# Patient Record
Sex: MALE | URBAN - METROPOLITAN AREA
[De-identification: names, ages, dates, MRNs, and addresses within clinical notes are randomized per-mention and may not be internally consistent; named-entity substitution may affect disease eponyms.]

---

## 2021-08-02 ENCOUNTER — OFFICE VISIT (OUTPATIENT)
Dept: FAMILY MEDICINE CLINIC | Facility: CLINIC | Age: 41
End: 2021-08-02
Payer: COMMERCIAL

## 2021-08-02 VITALS
BODY MASS INDEX: 32.83 KG/M2 | HEART RATE: 102 BPM | SYSTOLIC BLOOD PRESSURE: 150 MMHG | WEIGHT: 264 LBS | RESPIRATION RATE: 18 BRPM | HEIGHT: 75 IN | TEMPERATURE: 98 F | DIASTOLIC BLOOD PRESSURE: 100 MMHG

## 2021-08-02 DIAGNOSIS — Z00.00 ANNUAL PHYSICAL EXAM: Primary | ICD-10-CM

## 2021-08-02 DIAGNOSIS — I10 ESSENTIAL HYPERTENSION: ICD-10-CM

## 2021-08-02 DIAGNOSIS — E11.9 TYPE 2 DIABETES MELLITUS WITHOUT COMPLICATION, WITHOUT LONG-TERM CURRENT USE OF INSULIN (HCC): ICD-10-CM

## 2021-08-02 DIAGNOSIS — R79.89 LOW TESTOSTERONE: ICD-10-CM

## 2021-08-02 LAB
CREAT UR-SCNC: 82.1 MG/DL
MICROALBUMIN UR-MCNC: 31.2 MG/DL
MICROALBUMIN/CREAT 24H UR-RTO: 380 UG/MG (ref ?–30)

## 2021-08-02 PROCEDURE — 99386 PREV VISIT NEW AGE 40-64: CPT | Performed by: FAMILY MEDICINE

## 2021-08-02 PROCEDURE — 3077F SYST BP >= 140 MM HG: CPT | Performed by: FAMILY MEDICINE

## 2021-08-02 PROCEDURE — 3008F BODY MASS INDEX DOCD: CPT | Performed by: FAMILY MEDICINE

## 2021-08-02 PROCEDURE — 3060F POS MICROALBUMINURIA REV: CPT | Performed by: FAMILY MEDICINE

## 2021-08-02 PROCEDURE — 82043 UR ALBUMIN QUANTITATIVE: CPT | Performed by: FAMILY MEDICINE

## 2021-08-02 PROCEDURE — 99214 OFFICE O/P EST MOD 30 MIN: CPT | Performed by: FAMILY MEDICINE

## 2021-08-02 PROCEDURE — 82570 ASSAY OF URINE CREATININE: CPT | Performed by: FAMILY MEDICINE

## 2021-08-02 PROCEDURE — 3080F DIAST BP >= 90 MM HG: CPT | Performed by: FAMILY MEDICINE

## 2021-08-02 RX ORDER — LISINOPRIL 20 MG/1
20 TABLET ORAL DAILY
Qty: 30 TABLET | Refills: 1 | Status: SHIPPED | OUTPATIENT
Start: 2021-08-02 | End: 2021-10-14

## 2021-08-02 RX ORDER — ORAL SEMAGLUTIDE 7 MG/1
TABLET ORAL
COMMUNITY
End: 2021-08-02

## 2021-08-02 RX ORDER — ORAL SEMAGLUTIDE 7 MG/1
7 TABLET ORAL DAILY
Qty: 90 TABLET | Refills: 1 | Status: SHIPPED | OUTPATIENT
Start: 2021-08-02

## 2021-08-02 NOTE — PROGRESS NOTES
Jaja Scanlon is a 38year old male that presents for annual physical exam.     Patient presents with:  Establish Care: [place] of state, establish care-paperwork for job  Diabetes: Type 2 diabetes, Metformin 500mg      Diet and exercise: varied die The patient is a 67y Male complaining of irregular heartbeat. Activity      Alcohol use: Yes        Comment: Social      Drug use: Never      Sexual activity: Yes        Partners: Female    Other Topics      Concerns:        Not on file    Social History Narrative      Not on file    Social Determinants of Health  Fi Height as of this encounter: 6' 3\" (1.905 m). Weight as of this encounter: 264 lb (119.7 kg).    GENERAL: well developed, well nourished, in no apparent distress  SKIN: no rashes, no suspicious lesions  HEENT: atraumatic, normocephalic, ears and throat discussed in detail. Questions and concerns addressed. Red flags to RTC or ED reviewed. Patient (or parent) agrees to plan. Return in about 4 weeks (around 8/30/2021) for blood pressure check.     Lnidsey Cali DO  Family Medicine   8/2/2021  5:45

## 2021-08-02 NOTE — PATIENT INSTRUCTIONS
Blood Pressure  Lisinopril start with 10 mg (1/2 tablet) for one week, if no side effects, then start full tab 20 mg the next week.   Goal BP < 140/90  Follow up in 1 mo     Schedule diabetic eye exam and complete blood tests at [facility]       Thank you for al Hepatitis C Men at increased risk for infection – talk with your healthcare provider At routine exams   High cholesterol or triglycerides All men ages 28 and older, and younger men at high risk for coronary artery disease At least every 5 years   HIV All at increased risk for infection – talk with your healthcare provider PCV13: 1 dose ages 23 to 72 (protects against 13 types of pneumococcal bacteria)     PPSV23: 1 to 2 doses through age 59, or 1 dose at 72 or older (protects against 23 types of pneumococc

## 2021-09-25 PROCEDURE — 3060F POS MICROALBUMINURIA REV: CPT | Performed by: FAMILY MEDICINE

## 2021-09-25 PROCEDURE — 3046F HEMOGLOBIN A1C LEVEL >9.0%: CPT | Performed by: FAMILY MEDICINE

## 2021-09-28 LAB
ABSOLUTE BASOPHILS: 48 CELLS/UL (ref 0–200)
ABSOLUTE EOSINOPHILS: 192 CELLS/UL (ref 15–500)
ABSOLUTE LYMPHOCYTES: 2178 CELLS/UL (ref 850–3900)
ABSOLUTE MONOCYTES: 570 CELLS/UL (ref 200–950)
ABSOLUTE NEUTROPHILS: 3012 CELLS/UL (ref 1500–7800)
ALBUMIN/GLOBULIN RATIO: 2 (CALC) (ref 1–2.5)
ALBUMIN: 5.1 G/DL (ref 3.6–5.1)
ALKALINE PHOSPHATASE: 103 U/L (ref 36–130)
ALT: 70 U/L (ref 9–46)
AST: 43 U/L (ref 10–40)
BASOPHILS: 0.8 %
BILIRUBIN, TOTAL: 0.8 MG/DL (ref 0.2–1.2)
BUN: 14 MG/DL (ref 7–25)
CALCIUM: 10.3 MG/DL (ref 8.6–10.3)
CARBON DIOXIDE: 28 MMOL/L (ref 20–32)
CHLORIDE: 100 MMOL/L (ref 98–110)
CHOL/HDLC RATIO: 3.8 (CALC)
CHOLESTEROL, TOTAL: 217 MG/DL
CREATININE, RANDOM URINE: 247 MG/DL (ref 20–320)
CREATININE: 0.95 MG/DL (ref 0.6–1.35)
EGFR IF AFRICN AM: 115 ML/MIN/1.73M2
EGFR IF NONAFRICN AM: 99 ML/MIN/1.73M2
EOSINOPHILS: 3.2 %
FREE TESTOSTERONE: 48.7 PG/ML (ref 35–155)
GLOBULIN: 2.6 G/DL (CALC) (ref 1.9–3.7)
GLUCOSE: 273 MG/DL (ref 65–99)
HDL CHOLESTEROL: 57 MG/DL
HEMATOCRIT: 49.8 % (ref 38.5–50)
HEMOGLOBIN A1C: 10.8 % OF TOTAL HGB
HEMOGLOBIN: 16.6 G/DL (ref 13.2–17.1)
LDL-CHOLESTEROL: 132 MG/DL (CALC)
LYMPHOCYTES: 36.3 %
MCH: 30.2 PG (ref 27–33)
MCHC: 33.3 G/DL (ref 32–36)
MCV: 90.5 FL (ref 80–100)
MICROALBUMIN/CREATININE RATIO, RANDOM URINE: 253 MCG/MG CREAT
MICROALBUMIN: 62.4 MG/DL
MONOCYTES: 9.5 %
MPV: 13.1 FL (ref 7.5–12.5)
NEUTROPHILS: 50.2 %
NON-HDL CHOLESTEROL: 160 MG/DL (CALC)
PLATELET COUNT: 131 THOUSAND/UL (ref 140–400)
POTASSIUM: 4.4 MMOL/L (ref 3.5–5.3)
PROTEIN, TOTAL: 7.7 G/DL (ref 6.1–8.1)
RDW: 12.8 % (ref 11–15)
RED BLOOD CELL COUNT: 5.5 MILLION/UL (ref 4.2–5.8)
SODIUM: 139 MMOL/L (ref 135–146)
TESTOSTERONE, TOTAL,$/MS/MS: 302 NG/DL (ref 250–1100)
TRIGLYCERIDES: 150 MG/DL
TSH W/REFLEX TO FT4: 2.16 MIU/L (ref 0.4–4.5)
WHITE BLOOD CELL COUNT: 6 THOUSAND/UL (ref 3.8–10.8)

## 2021-10-08 ENCOUNTER — TELEPHONE (OUTPATIENT)
Dept: FAMILY MEDICINE CLINIC | Facility: CLINIC | Age: 41
End: 2021-10-08

## 2021-10-08 DIAGNOSIS — E11.9 TYPE 2 DIABETES MELLITUS WITHOUT COMPLICATION, WITHOUT LONG-TERM CURRENT USE OF INSULIN (HCC): ICD-10-CM

## 2021-10-08 NOTE — TELEPHONE ENCOUNTER
LM informing pt lab results showed very uncontrolled diabetes and giving Dr. Rosette Grimaldo recommendation to schedule an appointment ASAP but in the meantime to increase Metformin from 1 to 2 pills daily (1 in the AM and 1 in the PM).  Provided office number for

## 2021-10-11 NOTE — TELEPHONE ENCOUNTER
LM informing pt that I was following up on previous message regarding lab results showing uncontrolled diabetes and Dr. Tahira Justice recommendations. Provided office number to schedule and appointment.

## 2021-10-14 ENCOUNTER — OFFICE VISIT (OUTPATIENT)
Dept: FAMILY MEDICINE CLINIC | Facility: CLINIC | Age: 41
End: 2021-10-14
Payer: COMMERCIAL

## 2021-10-14 VITALS
RESPIRATION RATE: 16 BRPM | HEIGHT: 75 IN | SYSTOLIC BLOOD PRESSURE: 130 MMHG | WEIGHT: 262.38 LBS | HEART RATE: 99 BPM | OXYGEN SATURATION: 99 % | DIASTOLIC BLOOD PRESSURE: 88 MMHG | BODY MASS INDEX: 32.62 KG/M2 | TEMPERATURE: 97 F

## 2021-10-14 DIAGNOSIS — I10 ESSENTIAL HYPERTENSION: ICD-10-CM

## 2021-10-14 DIAGNOSIS — R79.89 ELEVATED LFTS: ICD-10-CM

## 2021-10-14 DIAGNOSIS — E11.9 TYPE 2 DIABETES MELLITUS WITHOUT COMPLICATION, WITHOUT LONG-TERM CURRENT USE OF INSULIN (HCC): Primary | ICD-10-CM

## 2021-10-14 DIAGNOSIS — D69.6 THROMBOCYTOPENIA (HCC): ICD-10-CM

## 2021-10-14 DIAGNOSIS — E78.2 MIXED HYPERLIPIDEMIA: ICD-10-CM

## 2021-10-14 PROCEDURE — 3079F DIAST BP 80-89 MM HG: CPT | Performed by: FAMILY MEDICINE

## 2021-10-14 PROCEDURE — 3075F SYST BP GE 130 - 139MM HG: CPT | Performed by: FAMILY MEDICINE

## 2021-10-14 PROCEDURE — 99214 OFFICE O/P EST MOD 30 MIN: CPT | Performed by: FAMILY MEDICINE

## 2021-10-14 PROCEDURE — 3008F BODY MASS INDEX DOCD: CPT | Performed by: FAMILY MEDICINE

## 2021-10-14 RX ORDER — LISINOPRIL 20 MG/1
20 TABLET ORAL DAILY
Qty: 90 TABLET | Refills: 1 | Status: SHIPPED | OUTPATIENT
Start: 2021-10-14

## 2021-10-14 NOTE — PATIENT INSTRUCTIONS
Diet: Diabetes  Food is an important tool that you can use to control diabetes and stay healthy. Eating well-balanced meals in the correct amounts will help you control your blood glucose levels and prevent low blood sugar reactions.  It will also help yo water or calorie-free diet drinks instead. · Eat less fat to help lower your risk of heart disease. Use nonfat or low-fat dairy products and lean meats. Avoid fried foods. Use cooking oils that are unsaturated, such as olive, canola, or peanut oil.   · Eva Harbour

## 2021-10-14 NOTE — PROGRESS NOTES
HPI:   Chaitanya Rizo is a 38year old male. Patient presents with:  Lab Results: 9/25 - here to discuss  Immunization/Injection: declined flu vaccine today    Patient notes lots of work stress and recent move to [place], has been eating very poorly.   Donuts a distress. HEENT:     Head:  Normocephalic, atraumatic    Eyes: EOMI, PERRLA, no scleral icterus, conjunctivae clear, no eye discharge   SKIN: No rashes, no skin lesion, no bruising, good turgor.   HEART:  Regular rate and rhythm, no murmurs, rubs or gallop

## 2024-12-02 ENCOUNTER — TELEPHONE (OUTPATIENT)
Dept: FAMILY MEDICINE CLINIC | Facility: CLINIC | Age: 44
End: 2024-12-02

## 2024-12-02 DIAGNOSIS — Z02.9 ADMINISTRATIVE ENCOUNTER: Primary | ICD-10-CM

## 2024-12-03 ENCOUNTER — PATIENT OUTREACH (OUTPATIENT)
Dept: CASE MANAGEMENT | Age: 44
End: 2024-12-03

## 2024-12-03 NOTE — PROCEDURES
The office order for PCP removal request is Approved and finalized on December 3, 2024.    Removed Adarsh Moe MD as the patient's Primary Care Physician

## (undated) NOTE — LETTER
October 11, 2021      [facility]  [address]      Dear Bandar Atkinson:    Our office has been trying to contact you to discuss your recent test results.   Please contact our office as soon as possible at [phone] to schedule an appoin